# Patient Record
Sex: FEMALE | Race: WHITE
[De-identification: names, ages, dates, MRNs, and addresses within clinical notes are randomized per-mention and may not be internally consistent; named-entity substitution may affect disease eponyms.]

---

## 2024-03-10 ENCOUNTER — HOSPITAL ENCOUNTER (EMERGENCY)
Dept: HOSPITAL 99 - EMR | Age: 29
Discharge: HOME | End: 2024-03-10
Payer: COMMERCIAL

## 2024-03-10 VITALS — RESPIRATION RATE: 16 BRPM | SYSTOLIC BLOOD PRESSURE: 113 MMHG | DIASTOLIC BLOOD PRESSURE: 66 MMHG

## 2024-03-10 VITALS — BODY MASS INDEX: 19.4 KG/M2
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
## 2024-03-10 DIAGNOSIS — S93.402A: Primary | ICD-10-CM

## 2024-03-10 PROCEDURE — 99283 EMERGENCY DEPT VISIT LOW MDM: CPT

## 2024-04-30 ENCOUNTER — OFFICE VISIT (OUTPATIENT)
Dept: OBGYN CLINIC | Facility: CLINIC | Age: 29
End: 2024-04-30
Payer: COMMERCIAL

## 2024-04-30 VITALS
BODY MASS INDEX: 17.04 KG/M2 | HEIGHT: 66 IN | SYSTOLIC BLOOD PRESSURE: 125 MMHG | HEART RATE: 94 BPM | WEIGHT: 106 LBS | DIASTOLIC BLOOD PRESSURE: 86 MMHG

## 2024-04-30 DIAGNOSIS — R29.898 ANKLE WEAKNESS: ICD-10-CM

## 2024-04-30 DIAGNOSIS — S93.492D SPRAIN OF ANTERIOR TALOFIBULAR LIGAMENT OF LEFT ANKLE, SUBSEQUENT ENCOUNTER: ICD-10-CM

## 2024-04-30 DIAGNOSIS — M25.672 ANKLE STIFFNESS, LEFT: ICD-10-CM

## 2024-04-30 DIAGNOSIS — M25.572 ACUTE LEFT ANKLE PAIN: Primary | ICD-10-CM

## 2024-04-30 PROCEDURE — 99203 OFFICE O/P NEW LOW 30 MIN: CPT | Performed by: PHYSICIAN ASSISTANT

## 2024-04-30 NOTE — PROGRESS NOTES
Orthopaedic Surgery - Office Note  Belen Peres (28 y.o. female)   : 1995   MRN: [id number]  Encounter Date: 2024    Chief Complaint   Patient presents with    Left Ankle - Pain         Assessment/Plan  Diagnoses and all orders for this visit:    Acute left ankle pain  -     Ambulatory Referral to Physical Therapy; Future  -     Durable Medical Equipment    Sprain of anterior talofibular ligament of left ankle, subsequent encounter  -     Ambulatory Referral to Physical Therapy; Future  -     Durable Medical Equipment    Ankle weakness  -     Ambulatory Referral to Physical Therapy; Future  -     Durable Medical Equipment    Ankle stiffness, left  -     Ambulatory Referral to Physical Therapy; Future  -     Durable Medical Equipment    The diagnosis as well as treatment options were reviewed with the patient in the office today.  This condition would not likely require surgical intervention but would benefit from formal physical therapy to improve range of motion and strength.  Patient may ice the ankle for comfort 20 minutes on 1 hour off 3 times a day.  On oral anti-inflammatory such as Aleve 1 tablet twice daily with food stopping calling if any stomach upset occurs may be considered.  Patient may weight-bear as tolerated.    She will be provided a lace up ankle support to be worn during work until she can regain her full range of motion and strength with PT and her home exercise program.       Return for Recheck in 3 weeks with myself..        History of Present Illness  This is a new patient with left ankle pain.  Patient had x-rays at [facility] on 3/10/2024 which were negative for fracture in the left ankle but did note soft tissue swelling laterally.  She reports the initial injury occurred on 3/10/2024 when she was climbing a rock wall.  She came down the rock wall and had an inversion injury to the ankle between an uneven mat and the floor.  She was placed in a walking boot that  "she wore regularly for 3 weeks and then discontinued.  She has had no other treatment.  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  She reports the ankle also feels weak.  She denies any new injuries.  She reports she is very active.  No calf pain or paresthesias are reported.  She has not had chronic problems with the ankle in the past    Review of Systems  Pertinent items are noted in HPI.  All other systems were reviewed and are negative.    Physical Exam  /86   Pulse 94   Ht 5' 6\" (1.676 m)   Wt 48.1 kg (106 lb)   BMI 17.11 kg/m²   Cons: Appears well.  No apparent distress.  Psych: Alert. Oriented x3.  Mood and affect normal.    Left ankle is with soft tissue edema over the ATFL.  She is tender to palpation over the ATFL.  She is nontender on the medial and lateral malleolus.  She is nontender in the anterior and high ankle.  She is nontender at the fibular head.  She is lacking a few degrees of dorsiflexion.  She has full plantarflexion.  She is lacking inversion and eversion range of motion.  She has pain and weakness to dorsiflexion inversion and eversion.  She has 5 out of 5 strength to plantarflexion.  She is nontender at the calcaneus plantar fascia and Achilles.  She has no calf tenderness and a negative Homans.  Dorsalis pedis and posterior tibial pulses are +2.  She is nontender at the fifth metatarsal and midfoot.          Studies Reviewed      Diagnostic Imaging Report  Signed  Order #:[id number]    Exams: CR Ankle - Left Min 3 Views **    PROCEDURE: CR Ankle - Left Min 3 Views **    CLINICAL INDICATION: pain. Twisting injury.    COMPARISON: None.    Findings/impression:    The anterior and lateral soft tissue swelling. No radiographically demonstrable fracture or dislocation.    Electronically signed by Lalito Hatfield MD 3/10/2024 7:31 PM    Dictated By: Liu BOYD,Lalito DANG.  Dictated Date & Time: 03/10/24 1930    Signed/Co-Signer By: Liu " Lalito BOYD /   Signed/Co-Signer Date & Time: 03/10/24 1931 /     Transcribed by: Lalito Hatfield   [facility] x-ray reports were reviewed today in the office.  Will attempt to obtain full office note records.      Procedures  No procedures today.    Medical, Surgical, Family, and Social History  The patient's medical history, family history, and social history, were reviewed and updated as appropriate.    History reviewed. No pertinent past medical history.    History reviewed. No pertinent surgical history.    History reviewed. No pertinent family history.    Social History     Occupational History    Not on file   Tobacco Use    Smoking status: Never    Smokeless tobacco: Never   Substance and Sexual Activity    Alcohol use: Not on file    Drug use: Not on file    Sexual activity: Not on file       Not on File    No current outpatient medications on file.      Shay Mcbride PA-C